# Patient Record
Sex: FEMALE | Race: OTHER
[De-identification: names, ages, dates, MRNs, and addresses within clinical notes are randomized per-mention and may not be internally consistent; named-entity substitution may affect disease eponyms.]

---

## 2020-05-05 ENCOUNTER — HOSPITAL ENCOUNTER (EMERGENCY)
Dept: HOSPITAL 62 - ER | Age: 35
LOS: 1 days | Discharge: HOME | End: 2020-05-06
Payer: SELF-PAY
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
## 2020-05-05 DIAGNOSIS — Z23: ICD-10-CM

## 2020-05-05 DIAGNOSIS — Z90.710: ICD-10-CM

## 2020-05-05 DIAGNOSIS — S81.852A: Primary | ICD-10-CM

## 2020-05-05 PROCEDURE — 99283 EMERGENCY DEPT VISIT LOW MDM: CPT

## 2020-05-05 PROCEDURE — 90471 IMMUNIZATION ADMIN: CPT

## 2020-05-05 PROCEDURE — 90715 TDAP VACCINE 7 YRS/> IM: CPT

## 2020-05-05 NOTE — ER DOCUMENT REPORT
ED Medical Screen (RME)





- General


Stated Complaint: DOG BITE/LEG PAIN


Time Seen by Provider: 05/05/20 23:22


Mode of Arrival: Wheelchair


Information source: Patient


Notes: 





Patient presents emergency department with dog bite to her left lower leg.  

Reports she was bit by the pitbull around the corner.  She is unsure of its 

rabies vaccine.  She reports she was just standing there and the dog bit her.  

Patient complains of pain has 3 bite marks noted no active bleeding.  Declines 

Percocet request Motrin.  Is unsure of her last tetanus.  Animal control has not

been notified.











I have greeted and performed a rapid initial assessment of this patient.  A 

comprehensive ED assessment and evaluation of the patient, analysis of test 

results and completion of the medical decision making process will be conducted 

by additional ED providers.


TRAVEL OUTSIDE OF THE [place] IN LAST 30 DAYS: No





- Related Data


Allergies/Adverse Reactions: 


                                        





ceftriaxone sodium [From Rocephin] Allergy (Verified 02/12/20 13:06)


   Anaphylaxis


metronidazole [From Flagyl] Allergy (Verified 02/12/20 13:06)


   


Metronidazole HCl [From Flagyl] Allergy (Verified 02/12/20 13:06)


   











Past Medical History





- Past Medical History


Cardiac Medical History: 


   Denies: Hx Coronary Artery Disease, Hx Heart Attack, Hx Hypertension


Pulmonary Medical History: 


   Denies: Hx Asthma, Hx Bronchitis, Hx COPD, Hx Pneumonia


Neurological Medical History: Denies: Hx Cerebrovascular Accident, Hx Seizures


GI Medical History: Reports: Hx Diverticulitis, Hx Gastroesophageal Reflux 

Disease.  Denies: Hx Hepatitis, Hx Hiatal Hernia, Hx Ulcer


Musculoskeltal Medical History: Denies Hx Arthritis


Psychiatric Medical History: Reports: Hx Anxiety


Infectious Medical History: Denies: Hx Hepatitis


Past Surgical History: Reports: Hx Hysterectomy - Partial lap-3/9/12-endome.  

Denies: Hx Mastectomy, Hx Open Heart Surgery, Hx Pacemaker





- Immunizations


Immunizations up to date: No


Hx Diphtheria, Pertussis, Tetanus Vaccination: No

## 2020-05-06 VITALS — SYSTOLIC BLOOD PRESSURE: 110 MMHG | DIASTOLIC BLOOD PRESSURE: 74 MMHG
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
## 2020-05-06 NOTE — RADIOLOGY REPORT (SQ)
CLINICAL INDICATION: dog bite. .



TECHNIQUE: 2 view(s) were obtained of the left leg.



COMPARISON: None.



FINDINGS:

No acute displaced fracture is identified of the leg.  Alignment

appears anatomic.  Joint spaces are within normal limits for age.

 Surrounding soft tissues are unremarkable.



If knee or ankle are clinically in suspicion, then dedicated

radiography is advised.





IMPRESSION: 

No evidence of acute bony injury to the leg.  No retained

radiopaque foreign body

## 2020-07-31 ENCOUNTER — HOSPITAL ENCOUNTER (EMERGENCY)
Dept: HOSPITAL 62 - ER | Age: 35
Discharge: HOME | End: 2020-07-31
Payer: COMMERCIAL

## 2020-07-31 VITALS — DIASTOLIC BLOOD PRESSURE: 72 MMHG | SYSTOLIC BLOOD PRESSURE: 117 MMHG

## 2020-07-31 DIAGNOSIS — R10.2: ICD-10-CM

## 2020-07-31 DIAGNOSIS — N34.2: ICD-10-CM

## 2020-07-31 DIAGNOSIS — N76.0: Primary | ICD-10-CM

## 2020-07-31 DIAGNOSIS — B96.89: ICD-10-CM

## 2020-07-31 LAB
APPEARANCE UR: (no result)
APTT PPP: YELLOW S
BILIRUB UR QL STRIP: NEGATIVE
CHLAM PCR: NOT DETECTED
GLUCOSE UR STRIP-MCNC: NEGATIVE MG/DL
KETONES UR STRIP-MCNC: NEGATIVE MG/DL
NITRITE UR QL STRIP: NEGATIVE
PH UR STRIP: 7 [PH] (ref 5–9)
PROT UR STRIP-MCNC: NEGATIVE MG/DL
SP GR UR STRIP: 1.02
T.VAGINALIS (WET MOUNT): (no result)
UROBILINOGEN UR-MCNC: NEGATIVE MG/DL (ref ?–2)
WBCS (WET MOUNT): (no result)
YEAST (WET MOUNT): (no result)

## 2020-07-31 PROCEDURE — 87086 URINE CULTURE/COLONY COUNT: CPT

## 2020-07-31 PROCEDURE — 87210 SMEAR WET MOUNT SALINE/INK: CPT

## 2020-07-31 PROCEDURE — 99283 EMERGENCY DEPT VISIT LOW MDM: CPT

## 2020-07-31 PROCEDURE — 87491 CHLMYD TRACH DNA AMP PROBE: CPT

## 2020-07-31 PROCEDURE — 87591 N.GONORRHOEAE DNA AMP PROB: CPT

## 2020-07-31 PROCEDURE — 81001 URINALYSIS AUTO W/SCOPE: CPT
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
## 2020-08-28 ENCOUNTER — HOSPITAL ENCOUNTER (EMERGENCY)
Dept: HOSPITAL 62 - ER | Age: 35
Discharge: HOME | End: 2020-08-28
Payer: COMMERCIAL

## 2020-08-28 VITALS — SYSTOLIC BLOOD PRESSURE: 129 MMHG | DIASTOLIC BLOOD PRESSURE: 61 MMHG

## 2020-08-28 DIAGNOSIS — R10.84: ICD-10-CM

## 2020-08-28 DIAGNOSIS — R10.817: ICD-10-CM

## 2020-08-28 DIAGNOSIS — K59.00: Primary | ICD-10-CM

## 2020-08-28 DIAGNOSIS — Z90.710: ICD-10-CM

## 2020-08-28 DIAGNOSIS — Z88.1: ICD-10-CM

## 2020-08-28 DIAGNOSIS — J45.909: ICD-10-CM

## 2020-08-28 LAB
ADD MANUAL DIFF: NO
ALBUMIN SERPL-MCNC: 4.5 G/DL (ref 3.5–5)
ALP SERPL-CCNC: 70 U/L (ref 38–126)
ANION GAP SERPL CALC-SCNC: 8 MMOL/L (ref 5–19)
APPEARANCE UR: CLEAR
APTT PPP: (no result) S
AST SERPL-CCNC: 26 U/L (ref 14–36)
BASOPHILS # BLD AUTO: 0 10^3/UL (ref 0–0.2)
BASOPHILS NFR BLD AUTO: 0.2 % (ref 0–2)
BILIRUB DIRECT SERPL-MCNC: 0.2 MG/DL (ref 0–0.4)
BILIRUB SERPL-MCNC: 0.5 MG/DL (ref 0.2–1.3)
BILIRUB UR QL STRIP: NEGATIVE
BUN SERPL-MCNC: 15 MG/DL (ref 7–20)
CALCIUM: 9.2 MG/DL (ref 8.4–10.2)
CHLAM PCR: NOT DETECTED
CHLORIDE SERPL-SCNC: 105 MMOL/L (ref 98–107)
CO2 SERPL-SCNC: 25 MMOL/L (ref 22–30)
EOSINOPHIL # BLD AUTO: 0.1 10^3/UL (ref 0–0.6)
EOSINOPHIL NFR BLD AUTO: 1.5 % (ref 0–6)
ERYTHROCYTE [DISTWIDTH] IN BLOOD BY AUTOMATED COUNT: 13.2 % (ref 11.5–14)
GLUCOSE SERPL-MCNC: 97 MG/DL (ref 75–110)
GLUCOSE UR STRIP-MCNC: NEGATIVE MG/DL
HCT VFR BLD CALC: 41.4 % (ref 36–47)
HGB BLD-MCNC: 13.8 G/DL (ref 12–15.5)
KETONES UR STRIP-MCNC: NEGATIVE MG/DL
LYMPHOCYTES # BLD AUTO: 2.4 10^3/UL (ref 0.5–4.7)
LYMPHOCYTES NFR BLD AUTO: 26.3 % (ref 13–45)
MCH RBC QN AUTO: 30.2 PG (ref 27–33.4)
MCHC RBC AUTO-ENTMCNC: 33.3 G/DL (ref 32–36)
MCV RBC AUTO: 91 FL (ref 80–97)
MONOCYTES # BLD AUTO: 0.6 10^3/UL (ref 0.1–1.4)
MONOCYTES NFR BLD AUTO: 6.2 % (ref 3–13)
NEUTROPHILS # BLD AUTO: 6.1 10^3/UL (ref 1.7–8.2)
NEUTS SEG NFR BLD AUTO: 65.8 % (ref 42–78)
NITRITE UR QL STRIP: NEGATIVE
PH UR STRIP: 6 [PH] (ref 5–9)
PLATELET # BLD: 300 10^3/UL (ref 150–450)
POTASSIUM SERPL-SCNC: 4.3 MMOL/L (ref 3.6–5)
PROT SERPL-MCNC: 7.6 G/DL (ref 6.3–8.2)
PROT UR STRIP-MCNC: NEGATIVE MG/DL
RBC # BLD AUTO: 4.57 10^6/UL (ref 3.72–5.28)
SP GR UR STRIP: 1.02
TOTAL CELLS COUNTED % (AUTO): 100 %
UROBILINOGEN UR-MCNC: NEGATIVE MG/DL (ref ?–2)
WBC # BLD AUTO: 9.3 10^3/UL (ref 4–10.5)

## 2020-08-28 PROCEDURE — 85025 COMPLETE CBC W/AUTO DIFF WBC: CPT

## 2020-08-28 PROCEDURE — 87591 N.GONORRHOEAE DNA AMP PROB: CPT

## 2020-08-28 PROCEDURE — 36415 COLL VENOUS BLD VENIPUNCTURE: CPT

## 2020-08-28 PROCEDURE — 87491 CHLMYD TRACH DNA AMP PROBE: CPT

## 2020-08-28 PROCEDURE — 80053 COMPREHEN METABOLIC PANEL: CPT

## 2020-08-28 PROCEDURE — 81001 URINALYSIS AUTO W/SCOPE: CPT

## 2020-08-28 PROCEDURE — 74018 RADEX ABDOMEN 1 VIEW: CPT

## 2020-08-28 PROCEDURE — 99284 EMERGENCY DEPT VISIT MOD MDM: CPT

## 2020-08-28 NOTE — ER DOCUMENT REPORT
ED GI/





- General


Chief Complaint: Abdominal Pain


Stated Complaint: PELVIC PAIN


Time Seen by Provider: 08/28/20 21:12


Primary Care Provider: 


[facility] [Provider Group] - Follow up as needed


[facility] [Provider Group] - Follow up as needed


Mode of Arrival: Ambulatory


Information source: Patient


Notes: 





Patient presents complaining of lower pelvic pain for the past 4 days.  Patient 

states prior to having the pelvic pain she did have a rough intercourse.  

Patient states she has had a total hysterectomy.  Patient denies any fever, 

nausea, vomiting or diarrhea.  Patient denies any urinary symptoms.  Patient 

denies any vaginal bleeding or discharge.





Note by Olga Flores


35-year-old female presented to ED for complaint of lower abdominal pain x4 

days.  She states she did have rough intercourse recently.  She states she had a

total hysterectomy in 2012 when she was scared that she had torn something was 

having some kind of pain due to the hysterectomy.  She denies any nausea 

vomiting diarrhea or fever.  She denies any urinary symptoms.  She states she 

would like an ultrasound but did not want to do a transvaginal ultrasound.  She 

refused a transabdominal ultrasound.  The blood and x-ray had already been 

completed before I saw the patient.  X-ray did show constipation.  Patient did 

have hyperactive bowel sounds throughout her abdomen.  She did have generalized 

abdominal pain.








REVIEW OF SYSTEMS:


CONSTITUTIONAL :  Denies fever,  chills, or sweats.  Denies recent illness.


GASTROINTESTINAL: Patient complained of lower abdominal pain but denied any 

nausea vomiting fever or diarrhea.  She states she had a headache stool 

yesterday.


GENITOURINARY:  Denies difficulty urinating, painful urination, burning, frequ

ency, or blood in urine.


FEMALE  GENITOURINARY: States she has had a previous hysterectomy and had some 

"rough sex "and did not know if this was what was causing the pain.  She done 

denies any vaginal bleeding any vaginal discharge any pain or burning with 

urination.  Patient refused pelvic exam or transvaginal ultrasound





ALL OTHER SYSTEMS REVIEWED AND NEGATIVE.








VITAL SIGNS: Within normal limits.


GENERAL:  No acute distress, non-toxic appearance.  


CHEST:  Clear breath sounds bilaterally.  No wheezes, rales, or rhonchi.  


CARDIAC:  Regular rate and rhythm.  S1 and S2, without murmurs, gallops, or 

rubs.


ABDOMEN: Generalized abdominal tenderness


GASTROINTESTINAL: Hypoactive bowel sounds


Patient refused pelvic exam





TRAVEL OUTSIDE OF THE [place] IN LAST 30 DAYS: No





- HPI


Patient complains to provider of: Abdominal pain, Pelvic pain


Onset: Other - Past 4 days


Timing/Duration: Intermittent


Quality of pain: Sharp


Severity at maximum: Severe


Severity in ED: Mild


Pain Level: 2


Location: LUQ, LLQ, RUQ, RLQ


Vaginal bleeding (Compared to normal period): None


LMP: Hysterectomy


Associated symptoms: Constipation - Patient is x-ray shows constipation but no 

obstruction


Exacerbated by: Movement, Food


Relieved by: Denies


Similar symptoms previously: Yes


Recently seen / treated by doctor: No





- Related Data


Allergies/Adverse Reactions: 


                                        





ceftriaxone sodium [From Rocephin] Allergy (Verified 02/12/20 13:06)


   Anaphylaxis


metronidazole [From Flagyl] Allergy (Verified 02/12/20 13:06)


   


Metronidazole HCl [From Flagyl] Allergy (Verified 02/12/20 13:06)


   











Past Medical History





- General


Information source: Patient





- Social History


Smoking Status: Never Smoker


Chew tobacco use (# tins/day): No


Frequency of alcohol use: None


Drug Abuse: None


Family History: Reviewed & Not Pertinent


Patient has suicidal ideation: No


Patient has homicidal ideation: No





- Past Medical History


Cardiac Medical History: Reports: None


Pulmonary Medical History: Reports: Hx Asthma


EENT Medical History: Reports: None


Neurological Medical History: Reports: None


Endocrine Medical History: Reports: None


Renal/ Medical History: Reports: Other - Endometriosis


Malignancy Medical History: Reports: None


GI Medical History: Reports: Hx Gastroesophageal Reflux Disease, Other - H. 

pylori


Musculoskeletal Medical History: Reports None


Skin Medical History: Reports None


Psychiatric Medical History: Reports: Hx Anxiety


Traumatic Medical History: Reports: None


Infectious Medical History: Reports: None


Past Surgical History: Reports: Hx Hysterectomy - Partial lap-3/9/12-endome





- Immunizations


Immunizations up to date: No


Hx Diphtheria, Pertussis, Tetanus Vaccination: No





Physical Exam





- Vital signs


Vitals: 


                                        











Temp


 


 98.7 F 


 


 08/28/20 21:14














Course





- Re-evaluation


Re-evalutation: 





08/29/20 00:57


Discussed x-ray and labs with patient.  Patient refused pelvic exam.  She states

she just wanted her transabdominal ultrasound I explained to her that with her 

having constipation they would not be able to do a transabdominal ultrasound to 

check her site from her previous hysterectomy.  She did have tenderness 

throughout her whole abdomen with hypoactive bowel sounds.  She asked if she 

took the laxative for the constipation and she still had pain would she be able 

to come back to the emergency room I explained to her of course anytime she had 

increasing pain or any change in symptoms she needs to come right back.  Patient

states that she did not want a transvaginal ultrasound or pelvic exam at this 

time she would rather go home take the mag citrate and come back if the pain was

not relieved.  Patient was given a list of local doctors she can follow-up with 

us as well as return to the ED for any change in symptoms.  Patient verbalized 

understanding and agreement with this plan and was discharged home.





- Vital Signs


Vital signs: 


                                        











Temp Pulse Resp BP Pulse Ox


 


 98.7 F   75   14   129/61 H  99 


 


 08/28/20 21:16  08/28/20 23:26  08/28/20 23:26  08/28/20 23:26  08/28/20 23:26














- Laboratory


Result Diagrams: 


                                 08/28/20 21:18





                                 08/28/20 21:18





- Diagnostic Test


Radiology reviewed: Image reviewed, Reports reviewed





Discharge





- Discharge


Clinical Impression: 


Abdominal pain


Qualifiers:


 Abdominal location: generalized Qualified Code(s): R10.84 - Generalized 

abdominal pain





Constipation


Qualifiers:


 Constipation type: other constipation type Qualified Code(s): K59.09 - Other 

constipation





Condition: Stable


Disposition: HOME, SELF-CARE


Additional Instructions: 


ABDOMINAL PAIN:





     There are many causes of abdominal pain.  Pain can mean a serious problem 

requiring surgery (such as appendicitis). It can also be an innocent problem 

that goes away on its own (such as a viral infection).  Often, time must pass to

determine the cause of pain.


     The physician does not feel that hospitalization is necessary, at present. 

Things may change within the next 24 hours. Call the doctor or come back for re-

examination if any problems occur, such as:


     (1) Pain that becomes more severe, steady, or becomes concentrated in one 

specific area.  Also, pain that is more severe with movement or coughing.  


     (2) Vomiting that persists or becomes more frequent.  


     (3) Blood in the vomitus, urine, or bowel movements.  Blood in the stool m

ay have a tarry or black appearance.


     (4) Shaking chills or fever greater than 100 degrees F. 


     (5) The abdomen becomes more distended or swollen. 


     (6) Bowel movements cease. 


     (7) Failure to improve as expected.








NORMAL EXAM AND WORKUP:


     At this time, your examination and workup show no significant abnormality. 

No significant abnormal physical findings are noted.  All laboratory, EKG, and 

imaging (x-ray, CT scans, ultrasound) studies that were ordered show no 

significant abnormality.


     Although your examination and all studies that were ordered showed no 

significant abnormal finding, there are no examinations and no studies that are 

100% accurate.  There is always the possibility that some abnormality could 

exist and not be detected with physical examination or within the limits and 

capabilities of laboratory and other studies.


     You should return or follow up as you were instructed on your visit today 

for further evaluation if your symptoms do not resolve.








CONSTIPATION:





     Constipation is a common problem.  It is especially likely as you get 

older.  Constipation is a common cause of abdominal pain, but sometimes causes 

no symptoms at all.  Causes of constipation include certain medications, 

dehydration, diets, inactivity, and low-fiber intake.  Rarely, it can be a 

symptom of underlying disease.  The physician has evaluated you for this.


     Avoid constipation by eating a diet high in fiber, fruits, and vegetables. 

Drink plenty of liquids.  Get regular exercise.  If possible, avoid constipating

medicines like narcotic pain medication.  Some vitamin tablets can cause 

constipation.  Stool softeners may be needed for difficult cases.  An excellent 

stool softener is Konsyl which is available at [employer].  Just add a teaspoon to a glass of pineapple or orange juice daily or

twice a day if needed.


   Laxatives are useful for occasional constipation.  You should use them only 

when necessary.  Too-frequent use can make your bowels dependent on them.  Some 

over the counter laxatives available without prescription are:





Been given a bottle of mag citrate tonight.  Please drink this bottle of mag 

citrate tonight.  You will need to drink 8 to 10 glasses of water a day for the 

next couple weeks.  And you should drink on average of 6 to 8 glasses of water 

every day to prevent constipation.  If you continue to have abdominal pain 

develop any fevers nausea or vomiting please return to the emergency room 

immediately.





FOLLOW-UP CARE:


If you have been referred to a physician for follow-up care, call the 

physicians office for an appointment as you were instructed or within the next 

two days.  If you experience worsening or a significant change in your symptoms,

notify the physician immediately or return to the Emergency Department at any 

time for re-evaluation.


Forms:  Elevated Blood Pressure


Referrals: 


[facility] [Provider [facility]] - Follow up as needed


[facility] [Provider [facility]] - Follow up as needed

## 2020-08-28 NOTE — RADIOLOGY REPORT (SQ)
XR ABDOMEN 1 VIEW (KUB)



HISTORY: Lower abdominal pain.



COMPARISON: None.



FINDINGS:



There is a nonobstructive bowel gas pattern. There is copious

stool throughout the colon and rectum; correlate clinically for

constipation. No evidence of pneumoperitoneum. No radiopaque

urinary stones are seen. The visualized lungs are clear. 



IMPRESSION:



Constipation without bowel obstruction. No urinary stones.

## 2020-08-28 NOTE — ER DOCUMENT REPORT
ED Medical Screen (RME)





- General


Chief Complaint: Pelvic Pain


Stated Complaint: PELVIC PAIN


Time Seen by Provider: 08/28/20 21:12


Mode of Arrival: Ambulatory


Information source: Patient


Notes: 





Patient presents complaining of lower pelvic pain for the past 4 days.  Patient 

states prior to having the pelvic pain she did have a rough intercourse.  

Patient states she has had a total hysterectomy.  Patient denies any fever, 

nausea, vomiting or diarrhea.  Patient denies any urinary symptoms.  Patient 

denies any vaginal bleeding or discharge.





I have greeted and performed a rapid initial assessment of this patient.  A 

comprehensive ED assessment and evaluation of the patient, analysis of test 

results and completion of the medical decision making process will be conducted 

by additional ED providers.


TRAVEL OUTSIDE OF THE [place] IN LAST 30 DAYS: No





- Related Data


Allergies/Adverse Reactions: 


                                        





ceftriaxone sodium [From Rocephin] Allergy (Verified 02/12/20 13:06)


   Anaphylaxis


metronidazole [From Flagyl] Allergy (Verified 02/12/20 13:06)


   


Metronidazole HCl [From Flagyl] Allergy (Verified 02/12/20 13:06)


   











Past Medical History





- Past Medical History


Cardiac Medical History: 


   Denies: Hx Coronary Artery Disease, Hx Heart Attack, Hx Hypertension


Pulmonary Medical History: 


   Denies: Hx Asthma, Hx Bronchitis, Hx COPD, Hx Pneumonia


Neurological Medical History: Denies: Hx Cerebrovascular Accident, Hx Seizures


GI Medical History: Reports: Hx Diverticulitis, Hx Gastroesophageal Reflux 

Disease.  Denies: Hx Hepatitis, Hx Hiatal Hernia, Hx Ulcer


Musculoskeltal Medical History: Denies Hx Arthritis


Psychiatric Medical History: Reports: Hx Anxiety


Infectious Medical History: Denies: Hx Hepatitis


Past Surgical History: Reports: Hx Hysterectomy - Partial lap-3/9/12-endome.  

Denies: Hx Mastectomy, Hx Open Heart Surgery, Hx Pacemaker





- Immunizations


Immunizations up to date: No


Hx Diphtheria, Pertussis, Tetanus Vaccination: No





Physical Exam





- Abdominal


Tenderness: Tender - Lower pelvic tenderness

## 2020-09-03 ENCOUNTER — HOSPITAL ENCOUNTER (EMERGENCY)
Dept: HOSPITAL 62 - ER | Age: 35
Discharge: HOME | End: 2020-09-03
Payer: SELF-PAY

## 2020-09-03 VITALS — DIASTOLIC BLOOD PRESSURE: 57 MMHG | SYSTOLIC BLOOD PRESSURE: 117 MMHG

## 2020-09-03 DIAGNOSIS — K59.00: Primary | ICD-10-CM

## 2020-09-03 PROCEDURE — 99283 EMERGENCY DEPT VISIT LOW MDM: CPT

## 2020-09-03 PROCEDURE — 74018 RADEX ABDOMEN 1 VIEW: CPT

## 2020-09-03 NOTE — ER DOCUMENT REPORT
ED General





- General


Chief Complaint: Constipation


Stated Complaint: CONSTIPATION


Time Seen by Provider: 09/03/20 14:56


Mode of Arrival: Ambulatory


Information source: Patient


Notes: 


Patient is a 34 y/o female and presents for constipation for 1.5 weeks.  She was

seen in the ED 6 days ago for the same symptoms and denied an US and enema at 

the time.  Her last normal BM was over 1.5 weeks ago.  She has taken mag citrate

x3 in the last four days and had a small BM this morning with some loose and 

watery stool.  She denies fever, nausea, vomiting, and any urinary symptoms. 





TRAVEL OUTSIDE OF THE [place] IN LAST 30 DAYS: No





- Related Data


Allergies/Adverse Reactions: 


                                        





ceftriaxone sodium [From Rocephin] Allergy (Verified 02/12/20 13:06)


   Anaphylaxis


metronidazole [From Flagyl] Allergy (Verified 02/12/20 13:06)


   


Metronidazole HCl [From Flagyl] Allergy (Verified 02/12/20 13:06)


   











Past Medical History





- General


Information source: Patient





- Social History


Smoking Status: Never Smoker


Frequency of alcohol use: None


Drug Abuse: None


Family History: Reviewed & Not Pertinent





- Past Medical History


Cardiac Medical History: 


   Denies: Hx Coronary Artery Disease, Hx Heart Attack, Hx Hypertension


Pulmonary Medical History: Reports: Hx Asthma


   Denies: Hx Bronchitis, Hx COPD, Hx Pneumonia


Neurological Medical History: Denies: Hx Cerebrovascular Accident, Hx Seizures


GI Medical History: Reports: Hx Diverticulitis, Hx Gastroesophageal Reflux 

Disease.  Denies: Hx Hepatitis, Hx Hiatal Hernia, Hx Ulcer


Musculoskeletal Medical History: Denies Hx Arthritis


Psychiatric Medical History: Reports: Hx Anxiety


Infectious Medical History: Denies: Hx Hepatitis


Past Surgical History: Reports: Hx Hysterectomy - Partial lap-3/9/12-endome.  

Denies: Hx Mastectomy, Hx Open Heart Surgery, Hx Pacemaker





- Immunizations


Immunizations up to date: No


Hx Diphtheria, Pertussis, Tetanus Vaccination: No





Review of Systems





- Review of Systems


Notes: 


REVIEW OF SYSTEMS:


CONSTITUTIONAL :  Denies fever,  chills, or sweats.  Denies recent illness.


GASTROINTESTINAL:  Positive for abdominal pain and constipation. Denies nausea, 

vomiting.   Last BM: this morning. 


GENITOURINARY:  Denies difficulty urinating, painful urination, burning, 

frequency, or blood in urine.


FEMALE  GENITOURINARY:  Denies vaginal bleeding, abnormal or irregular periods. 




SKIN:   Denies rash or skin lesions.


ALL OTHER SYSTEMS REVIEWED AND NEGATIVE.





Physical Exam





- Vital signs


Vitals: 


                                        











Temp Pulse Resp BP Pulse Ox


 


 98.6 F   76   16   117/57 L  100 


 


 09/03/20 14:48  09/03/20 14:48  09/03/20 14:48  09/03/20 14:48  09/03/20 14:48














- Notes


Notes: 


VITAL SIGNS: Within normal limits.


GENERAL:  No acute distress, non-toxic appearance.  


HEAD:  Normal with no signs of head trauma.


EYES:  PERRLA, EOMI, conjunctiva normal, no discharge.  


EARS:  Hearing grossly intact.


NOSE: Normal. 


CHEST:  Clear breath sounds bilaterally.  No wheezes, rales, or rhonchi.  


CARDIAC:  Regular rate and rhythm.  S1 and S2, without murmurs, gallops, or 

rubs.


ABDOMEN: Normal and soft with no tenderness, no masses or pulsatile masses.


GASTROINTESTINAL: Bowel sounds normal


GENITOURINARY: Normal, No tenderness


NEUROLOGICAL:  Alert and oriented x 3.  No focal sensory or strength deficits.  

Speech normal.  Follows commands appropriately.


PSYCHIATRIC:  Normal Affect, judgement and mood.


SKIN:  Normal appearance with no rashes or lesions.








Course





- Re-evaluation


Re-evalutation: 





09/03/20 16:54 Patient appears well, non-toxic.  Vitals signs normal. KUB images

reviewed which show obvious constipation. Discussed with patient the 

recommendation for enema and she agrees.  Enema ordered and plan to discharge 

after it is complete. 





09/03/20 18:18


Patient given soap suds enema by nurse.  Patient had good results. Will 

discharge home. 





- Vital Signs


Vital signs: 


                                        











Temp Pulse Resp BP Pulse Ox


 


 98.6 F   76   16   117/57 L  100 


 


 09/03/20 14:48  09/03/20 14:48  09/03/20 14:48  09/03/20 14:48  09/03/20 14:48














- Diagnostic Test


Radiology reviewed: Image reviewed, Reports reviewed





Discharge





- Discharge


Clinical Impression: 


Constipation


Qualifiers:


 Constipation type: unspecified constipation type Qualified Code(s): K59.00 - 

Constipation, unspecified





Condition: Stable


Disposition: HOME, SELF-CARE


Additional Instructions: 


Constipation





     Constipation is a common problem.  It is especially likely as you get 

older.  Constipation is a common cause of abdominal pain, but sometimes causes 

no symptoms at all.  Causes of constipation include certain medications, 

dehydration, diets, inactivity, and low-fiber intake.  Rarely, it can be a 

symptom of underlying disease.  The physician has evaluated you for this.


     Avoid constipation by eating a diet high in fiber, fruits, and vegetables. 

Drink plenty of liquids.  Get regular exercise.  If possible, avoid constipating

medicines like narcotic pain medication.  Some vitamin tablets can cause 

constipation.  Stool softeners may be needed for difficult cases.  An excellent 

stool softener is Konsyl which is available at [employer].  Just add a teaspoon to a glass of pineapple or orange juice daily 

or twice a day if needed.


   Laxatives are useful for occasional constipation.  You should use them only 

when necessary.  Too-frequent use can make your bowels dependent on them.  Some 

over the counter laxatives available without prescription are:





   Milk of Magnesia, 1-2 tablespoons twice a day


    Dulcolax, 5 mg pill or 10 mg suppository.


   Citrate of Magnesia, 4-5 ounces a day for a day or two





For acute constipation, Fleet's Enemas and Dulcolax suppositories are helpful.


     Chronic, long term  use of laxatives or enemas is not a good idea.  Your 

bowel may become dependant on them.  You do not need to have a bowel movement 

every day.  Many people do fine with a bowel movement every three or four days.


     You should call your doctor or return for re-evaluation if you pass blood 

in the stool, or if you develop fever or increasing abdominal pain.











Referrals: 


CANDICE PINO MD [[facility]AFF] - Follow up as needed

## 2020-09-03 NOTE — RADIOLOGY REPORT (SQ)
EXAM DESCRIPTION:  KUB/ABDOMEN (SINGLE VIEW)



IMAGES COMPLETED DATE/TIME:  9/3/2020 3:21 pm



REASON FOR STUDY:  Constipation



COMPARISON:  8/28/2020



NUMBER OF VIEWS:  One view.



TECHNIQUE:   Supine radiographic image of the abdomen acquired.



LIMITATIONS:  None.



FINDINGS:  BOWEL GAS PATTERN: Normal bowel gas pattern. No dilated loops.

CALCIFICATIONS: No suspicious calcifications.

SOFT TISSUES: No gross mass or suggestion of organomegaly.

HARDWARE: None in the abdomen.

BONES: No acute fracture. No worrisome bone lesions.

OTHER: No other significant finding.



IMPRESSION:  NO RADIOGRAPHIC EVIDENCE FOR ACUTE ABDOMINAL DISEASE.



TECHNICAL DOCUMENTATION:  JOB ID:  [id number]

 2011 [employer]- All Rights Reserved



Reading location - IP/workstation name: [email or web address]

## 2020-10-25 ENCOUNTER — HOSPITAL ENCOUNTER (EMERGENCY)
Dept: HOSPITAL 62 - ER | Age: 35
Discharge: HOME | End: 2020-10-25
Payer: COMMERCIAL

## 2020-10-25 VITALS — SYSTOLIC BLOOD PRESSURE: 146 MMHG | DIASTOLIC BLOOD PRESSURE: 69 MMHG

## 2020-10-25 DIAGNOSIS — Z76.0: ICD-10-CM

## 2020-10-25 DIAGNOSIS — J45.909: ICD-10-CM

## 2020-10-25 DIAGNOSIS — B02.9: Primary | ICD-10-CM

## 2020-10-25 PROCEDURE — 99283 EMERGENCY DEPT VISIT LOW MDM: CPT

## 2020-10-25 NOTE — ER DOCUMENT REPORT
HPI





- HPI


Patient complains to provider of: shingles


Time Seen by Provider: 10/25/20 15:59


Onset: Yesterday


Onset/Duration: Gradual


Quality of pain: Burning


Context: 





Patient presents complaining of shingles flare of this started yesterday.  

Patient states she has had previous episodes of shingles in the past and this is

similar to prior outbreaks.  Patient also states that she has a history of 

asthma and is out of her inhaler and would like a refill.  Patient denies any 

cough or difficulty breathing at this time.


Associated Symptoms: Other - Skin rash


Exacerbated by: Denies


Relieved by: Denies


Similar symptoms previously: Yes


Recently seen / treated by doctor: No





- ROS


ROS below otherwise negative: Yes


Systems Reviewed and Negative: Yes All other systems reviewed and negative





- CONSTITUTIONAL


Constitutional: DENIES: Fever





- NEURO


Neurology: DENIES: Headache





- RESPIRATORY


Respiratory: DENIES: Coughing





- GASTROINTESTINAL


Gastrointestinal: DENIES: Nausea





- REPRODUCTIVE


Reproductive: DENIES: Pregnant:





- DERM


Skin Color: Normal


Skin Problems: Rash, Blister





Past Medical History





- General


Information source: Patient





- Social History


Smoking Status: Never Smoker


Frequency of alcohol use: None


Drug Abuse: None


Occupation: None


Family History: Reviewed & Not Pertinent





- Past Medical History


Cardiac Medical History: 


   Denies: Hx Coronary Artery Disease, Hx Heart Attack, Hx Hypertension


Pulmonary Medical History: Reports: Hx Asthma


   Denies: Hx Bronchitis, Hx COPD, Hx Pneumonia


Neurological Medical History: Denies: Hx Cerebrovascular Accident, Hx Seizures


GI Medical History: Reports: Hx Diverticulitis, Hx Gastroesophageal Reflux 

Disease.  Denies: Hx Hepatitis, Hx Hiatal Hernia, Hx Ulcer


Musculoskeletal Medical History: Denies Hx Arthritis


Psychiatric Medical History: Reports: Hx Anxiety


Infectious Medical History: Denies: Hx Hepatitis


Past Surgical History: Reports: Hx Hysterectomy - Partial lap-3/9/12-endome





- Immunizations


Immunizations up to date: No


Hx Diphtheria, Pertussis, Tetanus Vaccination: No





Vertical Provider Document





- CONSTITUTIONAL


Agree With Documented VS: Yes


Exam Limitations: No Limitations


General Appearance: WD/WN, No Apparent Distress





- INFECTION CONTROL


TRAVEL OUTSIDE OF THE [place] IN LAST 30 DAYS: No





- HEENT


HEENT: Atraumatic, Normocephalic





- NECK


Neck: Normal Inspection, Supple





- RESPIRATORY


Respiratory: Breath Sounds Normal, No Respiratory Distress, Chest Non-Tender.  

negative: Wheezing





- CARDIOVASCULAR


Cardiovascular: Regular Rate, Regular Rhythm





- BACK


Back: Normal Inspection





- MUSCULOSKELETAL/EXTREMETIES


Musculoskeletal/Extremeties: MADANTE, FROM





- NEURO


Level of Consciousness: Awake, Alert, Appropriate


Motor/Sensory: No Motor Deficit





- DERM


Integumentary: Warm, Dry, Rash - Vesicular rash to anterior chest area





Course





- Re-evaluation


Re-evalutation: 





10/25/20 16:06


Patient presents with vesicular lesions that do look consistent with shingles.  

Patient also requests refill of her albuterol inhaler as she does have a history

of asthma and no current primary doctor.  Patient without any difficulty 

breathing or wheezing at this time.





- Vital Signs


Vital signs: 


                                        











Temp Pulse Resp BP Pulse Ox


 


 98.1 F   87   16   146/69 H  98 


 


 10/25/20 15:55  10/25/20 15:55  10/25/20 15:55  10/25/20 15:55  10/25/20 15:55














Discharge





- Discharge


Clinical Impression: 


 History of asthma, Medication refill





Shingles


Qualifiers:


 Herpes zoster complications: without complications Qualified Code(s): B02.9 - 

Zoster without complications





Condition: Stable


Disposition: HOME, SELF-CARE


Instructions:  Inhaled Bronchodilators ([facility]), Shingles ([facility])


Additional Instructions: 


Return immediately for any new or worsening symptoms





Followup with your primary care provider, call tomorrow to make a followup 

appointment








Prescriptions: 


Albuterol Sulfate [Proair Hfa Inhalation Aerosol 8.5 gm Mdi] 2 puff IH Q4 PRN #1

mdi


 PRN Reason: 


Valacyclovir HCl [Valtrex] 1,000 mg PO TID #42 tablet


Referrals: 


[facility] [Provider Group] - Follow up as needed